# Patient Record
Sex: FEMALE | Race: WHITE | Employment: OTHER | URBAN - NONMETROPOLITAN AREA
[De-identification: names, ages, dates, MRNs, and addresses within clinical notes are randomized per-mention and may not be internally consistent; named-entity substitution may affect disease eponyms.]

---

## 2019-02-18 ENCOUNTER — HOSPITAL ENCOUNTER (OUTPATIENT)
Dept: GENERAL RADIOLOGY | Age: 59
Discharge: HOME | End: 2019-02-20
Payer: COMMERCIAL

## 2019-02-18 ENCOUNTER — HOSPITAL ENCOUNTER (OUTPATIENT)
Age: 59
Discharge: HOME | End: 2019-02-20
Payer: COMMERCIAL

## 2019-02-18 DIAGNOSIS — M25.552 LEFT HIP PAIN: ICD-10-CM

## 2019-02-18 DIAGNOSIS — M25.551 HIP PAIN, RIGHT: ICD-10-CM

## 2019-02-18 PROCEDURE — 73521 X-RAY EXAM HIPS BI 2 VIEWS: CPT

## 2019-06-10 ENCOUNTER — HOSPITAL ENCOUNTER (OUTPATIENT)
Dept: MRI IMAGING | Age: 59
Discharge: HOME | End: 2019-06-12
Payer: COMMERCIAL

## 2019-06-10 DIAGNOSIS — M25.552 LEFT HIP PAIN: ICD-10-CM

## 2019-06-10 PROCEDURE — 73721 MRI JNT OF LWR EXTRE W/O DYE: CPT

## 2020-12-21 ENCOUNTER — HOSPITAL ENCOUNTER (OUTPATIENT)
Dept: PHYSICAL THERAPY | Age: 60
Setting detail: THERAPIES SERIES
Discharge: HOME | End: 2020-12-21
Payer: COMMERCIAL

## 2020-12-21 PROCEDURE — 97161 PT EVAL LOW COMPLEX 20 MIN: CPT

## 2020-12-21 PROCEDURE — 97110 THERAPEUTIC EXERCISES: CPT

## 2020-12-23 ENCOUNTER — HOSPITAL ENCOUNTER (OUTPATIENT)
Dept: PHYSICAL THERAPY | Age: 60
Setting detail: THERAPIES SERIES
Discharge: HOME | End: 2020-12-23
Payer: COMMERCIAL

## 2020-12-23 PROCEDURE — 97110 THERAPEUTIC EXERCISES: CPT

## 2020-12-23 NOTE — PROGRESS NOTES
Phone: [address]          Fax: [phone]                      Outpatient Physical Therapy                                                                      Evaluation  Date: 2020  Patient: Saeed Stoddard  : 1960  CSN #: [id number]  Referring Practitioner: Ursula Ruiz MD    Referral Date : 20     Medical Diagnosis: s/p L DARNELL, Q14.857, hip pain, M25.559    Treatment Diagnosis: s/p L DARNELL  Onset Date: 20  PT Insurance Information: Medical [place]  Total # of Visits Approved: 12   Total # of Visits to Date: 1  No Show: 0  Canceled Appointment: 0     Subjective  Subjective: Patient reports having a lateral DARNELL on 2020. Patient reports she has received home health PT and has continued with her HEP. Patient reports pain ranges from 2/10 at best to 4/10 at worst.  She reports she was walking in [employer] parking lot when she had to move laterally quickly and reports she's had increased pain since then. She reports she has been using the cane and reports she was able to ambulate without cane around her home prior to yesterday's incident. 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 Additional Pertinent Hx: L RCR in 2015, depression    Objective     Observation/Palpation  Posture: Fair  Body Mechanics: Patient ambulates with SC, antalgic gait pattern  Scar: Healing well    Strength RLE  Strength RLE: WFL  Strength LLE  Strength LLE: Exception  L Hip Flexion: 3+/5  L Hip Extension: 3+/5  L Hip ABduction: 2+/5  L Hip ADduction: NT  L Knee Flexion: 3+/5  L Knee Extension: 4/5    Functional Outcome Measures  Pt disability report: 100% disabled  Assessment  Body structures, Functions, Activity limitations: Decreased functional mobility , Increased pain, Decreased ADL status, Decreased balance, Decreased ROM, Decreased strength  Assessment: The patient is a 61 y.o. female s/p L DARNELL.   She demonstrates decreased L hip ROM, decreased L LE strength, decreased endurance, and impaired balance. She would benefit from skilled PT to address her deficits to improve functional mobility. Prognosis: Good        Decision Making: Low Complexity    Patient Education  Patient Education: POC and exercise rationale  Pt verbalized/demonstrated good understanding:     [X] Yes         [] No, pt required further clarification. Goals  Short term goals  Time Frame for Short term goals: 3 weeks  Short term goal 1: Patient will be initiated with a HEP -MET  Short term goal 2: Patient will tolerate 30 minutes of therex/act to improve strength and endurance for ADLs    Long term goals  Time Frame for Long term goals : 6 weeks  Long term goal 1: Patient will be independent and compliant with a HEP  Long term goal 2: Patient will improve L LE strength to >/= 4/5 in all major joints and planes  Long term goal 3: Patient will improve L hip extension to >5* to normalize gait pattern  Long term goal 4: Patient will report decreased pain to <3/10 at worst  Long term goal 5: Patient will report 70% improvement in symptoms and overall function.     Patient goals : Walk correctly without pain    Minutes Tracking:  Time In: 1515  Time Out: 1610  Minutes: 55  Timed Code Treatment Minutes: Keila CHAPA Neal , [place], DPT    12/21/2020

## 2020-12-23 NOTE — PLAN OF CARE
[facility]           Phone: [phone]             Outpatient Physical Therapy  Fax: [phone]                                           Date: 2020  Patient: Joe Galindo : 1960 [facility] #: [id number]   Referring Practitioner:  Shanice Michaud MD Referral Date:  20       [x] Plan of Care   [] Updated Plan of Care    Dates of Service to Include: 2020 to 21    Diagnosis:  s/p L DARNELL, Z96.642, hip pain, M25.559    Rehab (Treatment) Diagnosis:  s/p L DARNELL             Onset Date:  20    Attendance  Total # of Visits to Date: 1 No Show: 0 Canceled Appointment: 0    Assessment  Body structures, Functions, Activity limitations: Decreased functional mobility , Increased pain, Decreased ADL status, Decreased balance, Decreased ROM, Decreased strength  Assessment: The patient is a 61 y.o. female s/p L DARNELL. She demonstrates decreased L hip ROM, decreased L LE strength, decreased endurance, and impaired balance. She would benefit from skilled PT to address her deficits to improve functional mobility. Goals  Short term goals  Time Frame for Short term goals: 3 weeks  Short term goal 1: Patient will be initiated with a HEP -MET  Short term goal 2: Patient will tolerate 30 minutes of therex/act to improve strength and endurance for ADLs  Long term goals  Time Frame for Long term goals : 6 weeks  Long term goal 1: Patient will be independent and compliant with a HEP  Long term goal 2: Patient will improve L LE strength to >/= 4/5 in all major joints and planes  Long term goal 3: Patient will improve L hip extension to >5* to normalize gait pattern  Long term goal 4: Patient will report decreased pain to <3/10 at worst  Long term goal 5: Patient will report 70% improvement in symptoms and overall function.      Prognosis  Prognosis: Good    Treatment Plan   Times per week: 2-3  Plan weeks: 6  [x] HP/CP      [] Electrical Stim   [x] Therapeutic Exercise      [x] Gait Training  [x] Aquatics   [] Ultrasound         [x] Patient Education/HEP   [x] Manual Therapy  [] Traction    [x] Neuro-shayna        [x] Soft Tissue Mobs            [] Home TENS  [] Iontophoresis    [] Orthotic casting/fitting      [] Dry Needling             Electronically signed by: James Shine PT, DPT    Date: 12/21/2020      ______________________________________ Date: 12/21/2020   Physician Signature

## 2020-12-23 NOTE — PROGRESS NOTES
Phone: [place]           Fax: [phone]                           Outpatient Physical Therapy                                                                            Daily Note    Patient: Andrey Macias : 1960  CSN #: [id number]   Referring Practitioner:  Irena Gant MD    Referral Date : 20     Date: 2020    Diagnosis: s/p L DARNELL, W40.103, hip pain, M25.559  Treatment Diagnosis: s/p L DARNELL    Onset Date: 20  PT Insurance Information: Medical [place]  Total # of Visits Approved: 12 Per Physician Order  Total # of Visits to Date: 2  No Show: 0  Canceled Appointment: 0      Pre-Treatment Pain:  3/10  Subjective: Patient reports 3/10 L hip pain and reports she has resumed walking around the house without her cane. Exercises:  Exercise 1: HEP: hip flexor stretch, bridge, sink exercises  Exercise 2: Scifit x10 min L1.0  Exercise 3: Sink exercises x15  Exercise 4: Fwd/lateral small bebo stepovers at counter 10x ea way L LE  Exercise 5: Sit/stands, arms across chest, 2x10  Exercise 6: Supine DL bridges 2x10; modified SLR's 2x10  Exercise 7: Supine L hip flexor stretch (R SKTC) [id number]    Modalities:  Cryotherapy (Minutes\Location): CPx10 min in hooklying to L hip to decrease pain and edema       Assessment  Assessment: Initiated gentle ther ex and functional strengthening to improve L hip strength and mobility within patient tolerance. Patient reporting soreness following sit/stands. CP at end of session to decrease soreness. Will progress as able. Activity Tolerance  Activity Tolerance: Patient Tolerated treatment well    Patient Education  Exercise technique and progression    Pt verbalized/demonstrated good understanding:     [x] Yes         [] No, pt required further clarification.        Post Treatment Pain:  2/10      Plan  Times per week: 2-3  Plan weeks: 6      Goals  (Total # of Visits to Date: 2)      Short term goals  Time Frame for Short term goals: 3 weeks  Short term goal 1: Patient will be initiated with a HEP -MET  Short term goal 2: Patient will tolerate 30 minutes of therex/act to improve strength and endurance for ADLs-met    Long term goals  Time Frame for Long term goals : 6 weeks  Long term goal 1: Patient will be independent and compliant with a HEP  Long term goal 2: Patient will improve L LE strength to >/= 4/5 in all major joints and planes  Long term goal 3: Patient will improve L hip extension to >5* to normalize gait pattern  Long term goal 4: Patient will report decreased pain to <3/10 at worst  Long term goal 5: Patient will report 70% improvement in symptoms and overall function.     Minutes Tracking:  Time In: 3425  Time Out: [address]  Minutes: 49  Timed Code Treatment Minutes: 1 [address], [facility]     Date: 12/23/2020

## 2020-12-28 ENCOUNTER — HOSPITAL ENCOUNTER (OUTPATIENT)
Dept: PHYSICAL THERAPY | Age: 60
Setting detail: THERAPIES SERIES
Discharge: HOME | End: 2020-12-28
Payer: COMMERCIAL

## 2020-12-28 PROCEDURE — 97530 THERAPEUTIC ACTIVITIES: CPT

## 2020-12-28 PROCEDURE — 97110 THERAPEUTIC EXERCISES: CPT

## 2020-12-29 NOTE — PROGRESS NOTES
INSURANCE VERIFICATION    Unlimited Visits-BMN    Deductible- $1000 (individual) has been met/ $2000 (family)- has been met    Covered at 80% AFTER deductible    OOP- $3500 w/ $9564.88 met as of date/ $7000 family w/ $2844.58 met as of date    BFO#[id number] w/ Elizabeth @ [phone]    Electronically signed by Kang Sam on 12/29/2020 at 2:08 PM
clarification. Post Treatment Pain:  1/10      Plan  Times per week: 2-3  Plan weeks: 6      Goals  (Total # of Visits to Date: 3)      Short term goals  Time Frame for Short term goals: 3 weeks  Short term goal 1: Patient will be initiated with a HEP -MET  Short term goal 2: Patient will tolerate 30 minutes of therex/act to improve strength and endurance for ADLs-met    Long term goals  Time Frame for Long term goals : 6 weeks  Long term goal 1: Patient will be independent and compliant with a HEP  Long term goal 2: Patient will improve L LE strength to >/= 4/5 in all major joints and planes  Long term goal 3: Patient will improve L hip extension to >5* to normalize gait pattern  Long term goal 4: Patient will report decreased pain to <3/10 at worst  Long term goal 5: Patient will report 70% improvement in symptoms and overall function.     Minutes Tracking:  Time In: 1312  Time Out: 1405  Minutes: 53  Timed Code Treatment Minutes: 7946 Humaira Aguero PT, DPT     Date: 12/28/2020

## 2020-12-30 ENCOUNTER — HOSPITAL ENCOUNTER (OUTPATIENT)
Dept: PHYSICAL THERAPY | Age: 60
Setting detail: THERAPIES SERIES
Discharge: HOME | End: 2020-12-30
Payer: COMMERCIAL

## 2020-12-30 PROCEDURE — 97110 THERAPEUTIC EXERCISES: CPT

## 2020-12-30 NOTE — PROGRESS NOTES
Phone: [place]           Fax: [phone]                           Outpatient Physical Therapy                                                                            Daily Note    Patient: Jerad Jeffery : 1960  CSN #: [id number]   Referring Practitioner:  Alba Figueroa MD    Referral Date : 20     Date: 2020    Diagnosis: s/p L DARNELL, W20.298, hip pain, M25.559  Treatment Diagnosis: s/p L DARNELL    Onset Date: 20  PT Insurance Information: Medical [place]  Total # of Visits Approved: 12 Per Physician Order  Total # of Visits to Date: 4  No Show: 0  Canceled Appointment: 0      Pre-Treatment Pain:  2/10  Subjective: Pt reports minimal pain prior to therapy today, 2/10. Pt states her hip feels tight and achy more than anything. Pt states her pain is generally low unless she is on her feet for a while. Exercises:  Exercise 2: Scifit x10 min L2.5  Exercise 4: Fwd/lateral small bebo stepovers at counter 15x ea way L LE  Exercise 5: Sit/stands, arms across chest, 2x15  Exercise 6: Supine DL bridges 2x10; modified SLR's x10  Exercise 7: Supine L hip flexor stretch (R SKTC) [id number]  Exercise 8: YTB TKE 2x10, YTB seated hamstring curl 2x15  Exercise 9: FSU/LSU @ 4\" step x10 ea  Exercise 10: Lateral amb with YTB x4 counter lengths  Exercise 11: LAQ with 3# weight 2x10  Exercise 12: Amb large loop without SPC x1    Manual:       Modalities:  Cryotherapy (Minutes\Location): CPx10 min in hooklying to L hip to decrease pain and edema       Assessment  Assessment: Pt tolerated tx well without increase in pain. Pt states pain can go up to 7-8/10 at times, after ambulating for long distances and standing on her feet for a long time. Ice post tx for soreness. Activity Tolerance       Patient Education  Reviewed ice/heat at home for tightness. Pt verbalized/demonstrated good understanding:     [x] Yes         [] No, pt required further clarification. Post Treatment Pain:  2/10      Plan  Times per week: 2-3  Plan weeks: 6      Goals  (Total # of Visits to Date: 4)      Short term goals  Time Frame for Short term goals: 3 weeks  Short term goal 1: Patient will be initiated with a HEP -MET  Short term goal 2: Patient will tolerate 30 minutes of therex/act to improve strength and endurance for ADLs-met    Long term goals  Time Frame for Long term goals : 6 weeks  Long term goal 1: Patient will be independent and compliant with a HEP - progressing  Long term goal 2: Patient will improve L LE strength to >/= 4/5 in all major joints and planes  Long term goal 3: Patient will improve L hip extension to >5* to normalize gait pattern  Long term goal 4: Patient will report decreased pain to <3/10 at worst  Long term goal 5: Patient will report 70% improvement in symptoms and overall function.     Minutes Tracking:  Time In: 6948  Time Out: 1119  Minutes: [place] RENE     Date: 12/30/2020

## 2021-01-04 ENCOUNTER — HOSPITAL ENCOUNTER (OUTPATIENT)
Dept: PHYSICAL THERAPY | Age: 61
Setting detail: THERAPIES SERIES
Discharge: HOME | End: 2021-01-04
Payer: COMMERCIAL

## 2021-01-04 PROCEDURE — 97110 THERAPEUTIC EXERCISES: CPT

## 2021-01-04 NOTE — PROGRESS NOTES
goal 2: Patient will tolerate 30 minutes of therex/act to improve strength and endurance for ADLs-met    Long term goals  Time Frame for Long term goals : 6 weeks  Long term goal 1: Patient will be independent and compliant with a HEP - progressing  Long term goal 2: Patient will improve L LE strength to >/= 4/5 in all major joints and planes  Long term goal 3: Patient will improve L hip extension to >5* to normalize gait pattern  Long term goal 4: Patient will report decreased pain to <3/10 at worst  Long term goal 5: Patient will report 70% improvement in symptoms and overall function.     Minutes Tracking:  Time In: 1237  Time Out: 8953  Minutes: 58  Timed Code Treatment Minutes: [place], [place]       Date: 1/4/2021

## 2021-01-07 ENCOUNTER — HOSPITAL ENCOUNTER (OUTPATIENT)
Dept: PHYSICAL THERAPY | Age: 61
Setting detail: THERAPIES SERIES
Discharge: HOME | End: 2021-01-07
Payer: COMMERCIAL

## 2021-01-07 PROCEDURE — 97110 THERAPEUTIC EXERCISES: CPT

## 2021-01-07 NOTE — PROGRESS NOTES
Phone: [phone]                 [facility]           Fax: [phone]                           Outpatient Physical Therapy                                                                            Daily Note    Patient: Marshall Rivero : 1960  CSN #: [id number]   Referring Practitioner:  Talbert Scheuermann, MD    Referral Date : 20     Date: 2021    Diagnosis: s/p L DARNELL, E55.881, hip pain, M25.559  Treatment Diagnosis: s/p L DARNELL    Onset Date: 20  PT Insurance Information: Medical [place]  Total # of Visits Approved: 12 Per Physician Order  Total # of Visits to Date: 6  No Show: 0  Canceled Appointment: 0      Pre-Treatment Pain:  2/10  Subjective: Pt reports she was a little sore following last treatment but it didnt last long. Pt rates current pain a 1-2/10. Exercises:  Exercise 1: HEP: hip flexor stretch, bridge, sink exercises  Exercise 2: Scifit x10 min 3.0  Exercise 3: Air-ex toe raise/ heel raise and marches 15x ea plus sls 3x20 ea  Exercise 4: Fwd/lateral small bebo stepovers at counter 15x ea way B LE  Exercise 5: Sit/stands, arms across chest, 2x15  Exercise 7: Supine L hip flexor stretch (R SKTC) [id number]  Exercise 9: FSU/LSU @ 6\" step x15 ea L HR only  Exercise 10: Lateral amb with YTB x4 counter lengths plus toe taps 15x ea  Exercise 11: LAQ with 3# weight 2x10  Exercise 13: retro wes 10.5#    Modalities:  Cryotherapy (Minutes\Location): CPx10 min in hooklying to L hip to decrease pain and edema     Assessment  Assessment: L hip ext to neutral today with supine hip flexor stretch. Pt unweights LLE during sit to stand but is corrected with v/c. Will cont to progress towards LTG. Activity Tolerance  Activity Tolerance: Patient Tolerated treatment well    Patient Education  Patient Education: Cont HEP, COnt stretching. Pt verbalized/demonstrated good understanding:     [x] Yes         [] No, pt required further clarification.      Post Treatment Pain: 2/10    Plan  Times per week: 2-3  Plan weeks: 6    Goals  (Total # of Visits to Date: 6)      Short term goals  Time Frame for Short term goals: 3 weeks  Short term goal 1: Patient will be initiated with a HEP -MET  Short term goal 2: Patient will tolerate 30 minutes of therex/act to improve strength and endurance for ADLs-met    Long term goals  Time Frame for Long term goals : 6 weeks  Long term goal 1: Patient will be independent and compliant with a HEP - progressing  Long term goal 2: Patient will improve L LE strength to >/= 4/5 in all major joints and planes  Long term goal 3: Patient will improve L hip extension to >5* to normalize gait pattern  Long term goal 4: Patient will report decreased pain to <3/10 at worst  Long term goal 5: Patient will report 70% improvement in symptoms and overall function.     Minutes Tracking:  Time In: 2026  Time Out: 1026  Minutes: 55  Timed Code Treatment Minutes: 1530 Janna Ayoub [place], [place]       Date: 1/7/2021

## 2021-01-11 ENCOUNTER — HOSPITAL ENCOUNTER (OUTPATIENT)
Dept: PHYSICAL THERAPY | Age: 61
Setting detail: THERAPIES SERIES
Discharge: HOME | End: 2021-01-11
Payer: COMMERCIAL

## 2021-01-11 PROCEDURE — 97110 THERAPEUTIC EXERCISES: CPT

## 2021-01-11 PROCEDURE — 97530 THERAPEUTIC ACTIVITIES: CPT

## 2021-01-11 NOTE — PROGRESS NOTES
Phone: [place]           Fax: [phone]                           Outpatient Physical Therapy                                                                            Daily Note    Patient: Bg Bridges : 1960  CSN #: [id number]   Referring Practitioner:  Karis Stover MD    Referral Date : 20     Date: 2021    Diagnosis: s/p L DARNELL, Q76.571, hip pain, M25.559  Treatment Diagnosis: s/p L DARNELL    Onset Date: 20  PT Insurance Information: Medical [place]  Total # of Visits Approved: 12 Per Physician Order  Total # of Visits to Date: 7  No Show: 0  Canceled Appointment: 0      Pre-Treatment Pain:  0/10  Subjective: Pt reports no pain this date. Pt states she does not use cane at home, only when she goes out, and feels it is more of a nuisance. Exercises:  Exercise 2: Scifit x10 min 3.0  Exercise 3: Air-ex toe raise/ heel raise and marches 20x ea plus sls 2x30 ea  Exercise 4: Fwd/lateral small bebo stepovers at counter 15x ea way B LE  Exercise 5: Sit/stands, arms across chest, 2x15  Exercise 7: Supine L hip flexor stretch (R SKTC) [id number]  Exercise 9: FSU/LSU @ 6\" step x15 ea L HR only  Exercise 11: LAQ with 3# weight 2x10  Exercise 13: retro wes 10# x5  Exercise 14: Hip ABD/ABD 45 degrees 2x10 each way YTB    Modalities:  Cryotherapy (Minutes\Location): CPx10 min in hooklying to L hip to decrease edema. Assessment  Assessment: Pt able to tolerate increased workload with exercise this date. Pt able to perform sit to stand without unweighting and no verbal cueing, an improvement from last session. Towards end of session, pt demonstrated hip flexor tightness, leading to need for stretching at this time. CP applied to reduce inflammation. Activity Tolerance  Activity Tolerance: Patient Tolerated treatment well    Patient Education  Patient Education: Cont HEP, COnt stretching.   Pt verbalized/demonstrated good understanding: [x] Yes         [] No, pt required further clarification. Post Treatment Pain:  0/10      Plan  Times per week: 2-3  Plan weeks: 6      Goals  (Total # of Visits to Date: 7)      Short term goals  Time Frame for Short term goals: 3 weeks  Short term goal 1: Patient will be initiated with a HEP -MET  Short term goal 2: Patient will tolerate 30 minutes of therex/act to improve strength and endurance for ADLs-met    Long term goals  Time Frame for Long term goals : 6 weeks  Long term goal 1: Patient will be independent and compliant with a HEP - progressing  Long term goal 2: Patient will improve L LE strength to >/= 4/5 in all major joints and planes  Long term goal 3: Patient will improve L hip extension to >5* to normalize gait pattern  Long term goal 4: Patient will report decreased pain to <3/10 at worst-MET  Long term goal 5: Patient will report 70% improvement in symptoms and overall function.     Minutes Tracking:  Time In: 1031  Time Out: [address]  Minutes: 51  Timed Code Treatment Minutes: [address] SPT directly supervised by: Abdirashid Joe PT, DPT     Date: 1/11/2021

## 2021-01-14 ENCOUNTER — HOSPITAL ENCOUNTER (OUTPATIENT)
Dept: PHYSICAL THERAPY | Age: 61
Setting detail: THERAPIES SERIES
Discharge: HOME | End: 2021-01-14
Payer: COMMERCIAL

## 2021-01-14 PROCEDURE — 97110 THERAPEUTIC EXERCISES: CPT

## 2021-01-14 NOTE — PROGRESS NOTES
Phone: [phone]                 [facility]           Fax: [phone]                           Outpatient Physical Therapy                                                                            Daily Note    Patient: Pradeep Min : 1960  [facility] #: [id number]   Referring Practitioner:  Sara Menjivar MD    Referral Date : 20     Date: 2021    Diagnosis: s/p L DARNELL, X67.760, hip pain, M25.559  Treatment Diagnosis: s/p L DARNELL    Onset Date: 20  PT Insurance Information: Medical [place]  Total # of Visits Approved: 12 Per Physician Order  Total # of Visits to Date: 8  No Show: 0  Canceled Appointment: 0    Pre-Treatment Pain:  0/10  Subjective: Pt states she feels she is doing well. Pt is hoping to RTW in feb. Pt states she really isnt having any pain, just soreness. Exercises:  Exercise 1: HEP: hip flexor stretch, bridge, sink exercises updated bridges, clamshels and hip abd in sidelying with bent knee. Also sideways amb with GTB  Exercise 2: Scifit x10 min 3.5  Exercise 4: Fwd/lateral small bebo stepovers at counter 15x ea way B LE  Exercise 5: Sit/stands, arms across chest, 2x15 5# L leg slightly behind. Exercise 9: FSU/LSU @ 6\" step x15 ea L HR only Opp toe tap  Exercise 13: retro wes 10# x5  Exercise 14: Hip ABD/ABD 45 degrees 2x10 each way YTB    Modalities:  Cryotherapy (Minutes\Location): CPx10 min in hooklying to L hip to decrease pain and edema     Assessment  Assessment: Added glute med strengthening exercises to HEP today to help better gait cycle. Pt cont to use SC for all distances per surgeon orders. Activity Tolerance  Activity Tolerance: Patient Tolerated treatment well    Patient Education  Patient Education: New HEp  Pt verbalized/demonstrated good understanding:     [x] Yes         [] No, pt required further clarification.      Post Treatment Pain:  0/10    Plan  Times per week: 2-3  Plan weeks: 6    Goals  (Total # of Visits to Date: 8) Short term goals  Time Frame for Short term goals: 3 weeks  Short term goal 1: Patient will be initiated with a HEP -MET  Short term goal 2: Patient will tolerate 30 minutes of therex/act to improve strength and endurance for ADLs-met    Long term goals  Time Frame for Long term goals : 6 weeks  Long term goal 1: Patient will be independent and compliant with a HEP - progressing  Long term goal 2: Patient will improve L LE strength to >/= 4/5 in all major joints and planes  Long term goal 3: Patient will improve L hip extension to >5* to normalize gait pattern  Long term goal 4: Patient will report decreased pain to <3/10 at worst-MET  Long term goal 5: Patient will report 70% improvement in symptoms and overall function.     Minutes Tracking:  Time In: 5232  Time Out: 1026  Minutes: 57  Timed Code Treatment Minutes: [place], [place]       Date: 1/14/2021

## 2021-01-18 ENCOUNTER — HOSPITAL ENCOUNTER (OUTPATIENT)
Dept: PHYSICAL THERAPY | Age: 61
Setting detail: THERAPIES SERIES
Discharge: HOME | End: 2021-01-18
Payer: COMMERCIAL

## 2021-01-18 PROCEDURE — 97110 THERAPEUTIC EXERCISES: CPT

## 2021-01-18 PROCEDURE — 97530 THERAPEUTIC ACTIVITIES: CPT

## 2021-01-18 NOTE — PROGRESS NOTES
Phone: [phone]                 [facility]           Fax: [phone]                           Outpatient Physical Therapy                                                                            Daily Note    Patient: Maykel Rockwell : 1960  [facility] #: [id number]   Referring Practitioner:  Luis Ga MD    Referral Date : 20     Date: 2021    Diagnosis: s/p L DARNELL, X55.174, hip pain, M25.559  Treatment Diagnosis: s/p L DARNELL    Onset Date: 20  PT Insurance Information: Medical [place]  Total # of Visits Approved: 12 Per Physician Order  Total # of Visits to Date: 9      Pre-Treatment Pain:  0/10  Subjective: Pt states she feels pretty good. Pt is not having any pain at this time, and still plans to RTW at the end of the month. Exercises:  Exercise 1: HEP: hip flexor stretch, bridge, sink exercises updated bridges (1 set of SL), clamshells (YTB) and hip abd in sidelying with bent knee (no resistance). Also sideways amb with GTB  Exercise 2: Scifit x10 min 3.5  Exercise 3: Air-ex toe raise/ heel raise and marches 20x ea plus tandem stance 2x30 seconds each leg and sls 2x30 ea  Exercise 4: Fwd/lateral small bebo stepovers at counter 15x ea way B LE  Exercise 5: Sit/stands, arms across chest, 2x15 5# L leg slightly behind. Exercise 9: FSU/LSU @ 6\" step x15 ea L HR only Opp toe tap  Exercise 13: retro wes 10# x5  Exercise 14: Hip ABD/ABD 45 degrees 2x15 each way YTB    Modalities:  Cryotherapy (Minutes\Location): CPx10 min in hooklying to L hip to decrease pain and edema       Assessment  Assessment: Pt tolerated exercise well this date. Continued with expanding HEP this date, adding single leg bridges to increase hip stability. Continue to progress as tolerated.     Activity Tolerance  Activity Tolerance: Patient Tolerated treatment well    Patient Education  Patient Education: Added to HEP, exercise rationale  Pt verbalized/demonstrated good understanding:     [x] Yes [] No, pt required further clarification. Post Treatment Pain:  0/10      Plan  Times per week: 2-3  Plan weeks: 6      Goals  (Total # of Visits to Date: 5)      Short term goals  Time Frame for Short term goals: 3 weeks  Short term goal 1: Patient will be initiated with a HEP -MET  Short term goal 2: Patient will tolerate 30 minutes of therex/act to improve strength and endurance for ADLs-met    Long term goals  Time Frame for Long term goals : 6 weeks  Long term goal 1: Patient will be independent and compliant with a HEP - progressing  Long term goal 2: Patient will improve L LE strength to >/= 4/5 in all major joints and planes  Long term goal 3: Patient will improve L hip extension to >5* to normalize gait pattern  Long term goal 4: Patient will report decreased pain to <3/10 at worst-MET  Long term goal 5: Patient will report 70% improvement in symptoms and overall function.     Minutes Tracking:  Time In: 1031  Time Out: 8857  Minutes: 56  Timed Code Treatment Minutes: 1725 [facility] directly supervised by: Val Goins PT, DPT    Date: 1/18/2021

## 2021-01-21 ENCOUNTER — HOSPITAL ENCOUNTER (OUTPATIENT)
Dept: PHYSICAL THERAPY | Age: 61
Setting detail: THERAPIES SERIES
Discharge: HOME | End: 2021-01-21
Payer: COMMERCIAL

## 2021-01-21 PROCEDURE — 97110 THERAPEUTIC EXERCISES: CPT

## 2021-01-21 NOTE — PROGRESS NOTES
Phone: [phone]                 [facility]           Fax: [phone]                           Outpatient Physical Therapy                                                                            Daily Note    Patient: Mely Hairston : 1960  CSN #: [id number]   Referring Practitioner:  Nikkie Torres MD    Referral Date : 20     Date: 2021    Diagnosis: s/p L DARNELL, J26.864, hip pain, M25.559  Treatment Diagnosis: s/p L DARNELL    Onset Date: 20  PT Insurance Information: Medical [place]  Total # of Visits Approved: 12 Per Physician Order  Total # of Visits to Date: 10  No Show: 0  Canceled Appointment: 0      Pre-Treatment Pain: 0 /10  Subjective: Pt reports no pain today. Just her \"usual stiffness and soreness. \"    Exercises:  Exercise 1: HEP: hip flexor stretch, bridge, sink exercises updated bridges (1 set of SL), clamshells (YTB) and hip abd in sidelying with bent knee (no resistance). Also sideways amb with GTB  Exercise 2: Scifit x10 min 3.5  Exercise 3: Air-ex toe raise/ heel raise and marches 20x ea plus tandem stance 2x30 seconds each leg and sls 2x30 ea  Exercise 4: Fwd/lateral small bebo stepovers at counter 15x ea way B LE  Exercise 5: Sit/stands 2x15 5# L leg slightly behind. Exercise 9: FSU/LSU @ 6\" step x15 ea L HR only Opp toe tap  Exercise 11: LAQ with 3# weight 2x15  Exercise 13: retro wes 10# x5  Exercise 14: Hip ABD/ABD 45 degrees 2x15 each way YTB    Assessment  Assessment: Continued with currrent program to increase hip stability. Pt having occasional LOB with narrow GABRIEL activities and is able to self correct. Pt declined need for CP at end of tx d/t having to leave for her daughters ultrasound. Will continue to progress as able. Activity Tolerance  Activity Tolerance: Patient Tolerated treatment well    Patient Education  Exercise rationale and technique.   Pt verbalized/demonstrated good understanding:     [x] Yes         [] No, pt required

## 2021-01-25 ENCOUNTER — HOSPITAL ENCOUNTER (OUTPATIENT)
Dept: PHYSICAL THERAPY | Age: 61
Setting detail: THERAPIES SERIES
Discharge: HOME | End: 2021-01-25
Payer: COMMERCIAL

## 2021-01-25 PROCEDURE — 97110 THERAPEUTIC EXERCISES: CPT

## 2021-01-25 NOTE — PROGRESS NOTES
Phone: [phone]                 [facility]           Fax: [phone]                           Outpatient Physical Therapy                                                                            Daily Note    Patient: Rob Araujo : 1960  CSN #: [id number]   Referring Practitioner:  Tanisha Rahman MD    Referral Date : 20     Date: 2021    Diagnosis: s/p L DARNELL, A16.783, hip pain, M25.559  Treatment Diagnosis: s/p L DARNELL    Onset Date: 20  PT Insurance Information: Medical [place]  Total # of Visits Approved: 12 Per Physician Order  Total # of Visits to Date: 11  No Show: 0  Canceled Appointment: 0    Pre-Treatment Pain:  0/10  Subjective: Pt states she feels like shes doing pretty good. Pt rates current pain a 0/10. Exercises:  Exercise 1: HEP: hip flexor stretch, bridge, sink exercises updated bridges (1 set of SL), clamshells (YTB) and hip abd in sidelying with bent knee (no resistance). Also sideways amb with GTB  Exercise 2: Scifit x10 min 3.5  Exercise 3: Air-ex toe raise/ heel raise and marches 20x ea plus tandem stance 2x30 seconds each leg and sls 2x30 ea  Exercise 4: Fwd/lateral small bebo stepovers at counter 15x ea way B LE  Exercise 5: Sit/stands 2x15 5# L leg slightly behind. Exercise 9: FSU/LSU @ 6\" step x15 ea L HR only Opp toe tap SD 6 inch  Exercise 10: Lateral amb with YTB x4 counter lengths plus toe taps 15x ea  Exercise 13: retro wes 10# x5 5# sideways amb  Exercise 14: Hip ABD/ABD 45 degrees 2x15 each way YTB    Assessment  Assessment: Pt reports pain can range from a 0-5/10 on normal day to day basis but her pain is more of a muscle soreness and tightness but no longer needs pain pills. Pt reports she is approx 95% off AD currently but only keeps if if icy conditions.    Pt able to ascend<>descend 3 6 inch steps without handrail but states she still uses one for greater number of steps,  Current L hip ROM with Pt self stretch 2* ext to 120* hip flex. L hip flex MMT grossly 4/5 and L glute med 3+/5. Pt continues to display lateral trunk lean with amb without AD. Activity Tolerance  Activity Tolerance: Patient Tolerated treatment well    Patient Education  Patient Education: Reviewed HEP to cont for glute med. Pt verbalized/demonstrated good understanding:     [x] Yes         [] No, pt required further clarification. Post Treatment Pain:  0/10    Plan  Times per week: 2-3  Plan weeks: 6    Goals  (Total # of Visits to Date: 6)      Short term goals  Time Frame for Short term goals: 3 weeks  Short term goal 1: Patient will be initiated with a HEP -MET  Short term goal 2: Patient will tolerate 30 minutes of therex/act to improve strength and endurance for ADLs-met    Long term goals  Time Frame for Long term goals : 6 weeks  Long term goal 1: Patient will be independent and compliant with a HEP - progressing  Long term goal 2: Patient will improve L LE strength to >/= 4/5 in all major joints and planes  Long term goal 3: Patient will improve L hip extension to >5* to normalize gait pattern  Long term goal 4: Patient will report decreased pain to <3/10 at worst-MET  Long term goal 5: Patient will report 70% improvement in symptoms and overall function.     Minutes Tracking:  Time In: 1101  Time Out: 4660  Minutes: 44  Timed Code Treatment Minutes: [address] [place], [place]       Date: 1/25/2021

## 2021-01-25 NOTE — PLAN OF CARE
[facility]           Phone: [phone]             Outpatient Physical Therapy  Fax: [phone]                                           Date: 2021  Patient: Samir Castellanos : 1960 [facility] #: [id number]   Referring Practitioner:  Thuy Morales MD Referral Date:  20       [] Plan of Care   [x] Updated Plan of Care    Dates of Service to Include: 2021 to 21    Diagnosis:  s/p L DARNELL, R96.048, hip pain, M25.559    Rehab (Treatment) Diagnosis:  s/p L DARNELL             Onset Date:  20    Attendance  Total # of Visits to Date: 6 No Show: 0 Canceled Appointment: 0    Assessment  Assessment: Pt reports pain can range from a 0-5/10 on normal day to day basis but her pain is more of a muscle soreness and tightness but no longer needs pain pills. Pt reports she is approx 95% off AD currently but only keeps if if icy conditions. Pt able to ascend<>descend 3 6 inch steps without handrail but states she still uses one for greater number of steps,  Current L hip ROM with Pt self stretch 2* ext to 120* hip flex. L hip flex MMT grossly 4/5 and L glute med 3+/5. Pt continues to display lateral trunk lean with amb without AD.       Goals  Short term goals  Time Frame for Short term goals: 3 weeks  Short term goal 1: Patient will be initiated with a HEP -MET  Short term goal 2: Patient will tolerate 30 minutes of therex/act to improve strength and endurance for ADLs-met  Long term goals  Time Frame for Long term goals : 6 weeks  Long term goal 1: Patient will be independent and compliant with a HEP - progressing  Long term goal 2: Patient will improve L LE strength to >/= 4/5 in all major joints and planes  Long term goal 3: Patient will improve L hip extension to >5* to normalize gait pattern  Long term goal 4: Patient will report decreased pain to <3/10 at worst-MET  Long term goal 5: Patient will report 70% improvement in symptoms and overall function.      Prognosis  GOOD    Treatment Plan   Times per week: 2  Plan weeks: 4  [x] HP/CP      [] Electrical Stim   [x] Therapeutic Exercise      [x] Gait Training  [] Aquatics   [] Ultrasound         [x] Patient Education/HEP   [x] Manual Therapy  [] Traction    [x] Neuro-shayna        [x] Soft Tissue Mobs            [] Home TENS  [] Iontophoresis    [] Orthotic casting/fitting      [] Dry Needling             Electronically signed by: Orly Rosario PT, DPT    Date: 1/25/2021      ______________________________________ Date: 1/25/2021   Physician Signature

## 2021-01-28 ENCOUNTER — HOSPITAL ENCOUNTER (OUTPATIENT)
Dept: PHYSICAL THERAPY | Age: 61
Setting detail: THERAPIES SERIES
Discharge: HOME | End: 2021-01-28
Payer: COMMERCIAL

## 2021-01-28 PROCEDURE — 97110 THERAPEUTIC EXERCISES: CPT

## 2021-01-28 NOTE — PROGRESS NOTES
Phone: [facility]           Fax: [phone]                           Outpatient Physical Therapy                                                                            Daily Note    Patient: Paramjit Pope : 1960  CSN #: [id number]   Referring Practitioner:  Eda Cooks, MD    Referral Date : 20     Date: 2021    Diagnosis: s/p L DARNELL, I05.114, hip pain, M25.559  Treatment Diagnosis: s/p L DARNELL    Onset Date: 20  PT Insurance Information: Medical [place]  Total # of Visits Approved: 20 Per Physician Order  Total # of Visits to Date: 12  No Show: 0  Canceled Appointment: 0      Pre-Treatment Pain:  3/10  Subjective: Pt reports she hasnt been able to reach the Dr yet regarding RTW but hoping to hear back soon. Pt rates current pain a 3/10. Exercises:  Exercise 1: HEP: hip flexor stretch, bridge, sink exercises updated bridges (1 set of SL), clamshells (YTB) and hip abd in sidelying with bent knee (no resistance). Also sideways amb with GTB  Exercise 2: Scifit x10 min 4.5  Exercise 4: Fwd/lateral small bebo stepovers at counter 15x ea way B LE  Exercise 5: Sit/stands 2x15 5# L leg slightly behind. Exercise 9: FSU 8 inch/LSU @ 6\" step x15 ea L HR only Opp toe tap SD 6 inch  Exercise 10: Lateral amb with YTB x4 counter lengths plus toe taps 15x ea  Exercise 13: retro wes 10# x5 5# sideways amb  Exercise 14: Hip ABD/ABD 45 degrees 2x15 each way YTB  Exercise 15: Leg Press 5 pl dl 2x10 and SL 2 pl 10x    Modalities:  Cryotherapy (Minutes\Location): CPx10 min in hooklying to L hip to decrease pain and edema     Assessment  Assessment: Pt continues to display L glute med strength limitations which is leading to antalgic gait cycle. Plan is to cont working on increased L hip strength. Activity Tolerance  Activity Tolerance: Patient Tolerated treatment well    Patient Education  Patient Education: Cont HEP.   Pt verbalized/demonstrated good understanding:     [x] Yes         [] No, pt required further clarification. Post Treatment Pain:  2/10    Plan  Times per week: 2-3  Plan weeks: 6    Goals  (Total # of Visits to Date: 15)      Short term goals  Time Frame for Short term goals: 3 weeks  Short term goal 1: Patient will be initiated with a HEP -MET  Short term goal 2: Patient will tolerate 30 minutes of therex/act to improve strength and endurance for ADLs-met    Long term goals  Time Frame for Long term goals : 6 weeks  Long term goal 1: Patient will be independent and compliant with a HEP - progressing  Long term goal 2: Patient will improve L LE strength to >/= 4/5 in all major joints and planes  Long term goal 3: Patient will improve L hip extension to >5* to normalize gait pattern  Long term goal 4: Patient will report decreased pain to <3/10 at worst-MET  Long term goal 5: Patient will report 70% improvement in symptoms and overall function.     Minutes Tracking:  Time In: 7644  Time Out: 1026  Minutes: 55  Timed Code Treatment Minutes: [address] [place], [place]       Date: 1/28/2021

## 2021-02-02 ENCOUNTER — HOSPITAL ENCOUNTER (OUTPATIENT)
Dept: PHYSICAL THERAPY | Age: 61
Setting detail: THERAPIES SERIES
Discharge: HOME | End: 2021-02-02
Payer: COMMERCIAL

## 2021-02-02 PROCEDURE — 97110 THERAPEUTIC EXERCISES: CPT

## 2021-02-02 NOTE — PROGRESS NOTES
Phone: [facility]           Fax: [phone]                           Outpatient Physical Therapy                                                                            Daily Note    Patient: Brain Flor : 1960  CSN #: [id number]   Referring Practitioner:  Jorge Luis Naranjo MD    Referral Date : 20     Date: 2021    Diagnosis: s/p L DARNELL, Y59.829, hip pain, M25.559  Treatment Diagnosis: s/p L DARNELL    Onset Date: 20  PT Insurance Information: Medical [place]  Total # of Visits Approved: 20 Per Physician Order  Total # of Visits to Date: 13  No Show: 0  Canceled Appointment: 0      Pre-Treatment Pain:  0/10  Subjective: Pt denies pain upon arrival. Pt states she was a little sore after last visit but subsided quickly. Pt states she is planning on going back to work tomorrow, stating she was able to return on Monday but school has been closed the past 2 days d/t snow. Exercises:  Exercise 2: Scifit x10 min 4.5  Exercise 4: Fwd/lateral small bebo stepovers at counter 15x ea way B LE  Exercise 5: Sit/stands 2x15 5# L leg slightly behind. Exercise 9: FSU 8 inch/LSU @ 6\" step x15 ea L HR only Opp toe tap SD 6 inch  Exercise 10: Lateral amb with YTB x4 counter lengths plus toe taps 15x ea  Exercise 13: retro wes 10# x5 5# sideways amb  Exercise 14: Hip ABD/ABD 45 degrees 2x15 each way YTB  Exercise 15: Leg Press 5 pl dl 2x10 and SL 3 pl 10x    Modalities:  Cryotherapy (Minutes\Location): CPx10 min in hooklying to L hip to decrease pain and edema       Assessment  Assessment: Gradual progressions made to ther ex program this date, which pt tolerates well. Pt scheduled to RTW tomorrow. Will progress as tolerated. Activity Tolerance  Activity Tolerance: Patient Tolerated treatment well    Patient Education  Patient Education: Reviewed exercise rationale.   Pt verbalized/demonstrated good understanding:     [x] Yes         [] No, pt required further clarification. Post Treatment Pain:  0/10      Plan  Times per week: 2-3  Plan weeks: 6      Goals  (Total # of Visits to Date: 15)      Short term goals  Time Frame for Short term goals: 3 weeks  Short term goal 1: Patient will be initiated with a HEP -MET  Short term goal 2: Patient will tolerate 30 minutes of therex/act to improve strength and endurance for ADLs-met    Long term goals  Time Frame for Long term goals : 6 weeks  Long term goal 1: Patient will be independent and compliant with a HEP - progressing  Long term goal 2: Patient will improve L LE strength to >/= 4/5 in all major joints and planes  Long term goal 3: Patient will improve L hip extension to >5* to normalize gait pattern  Long term goal 4: Patient will report decreased pain to <3/10 at worst-MET  Long term goal 5: Patient will report 70% improvement in symptoms and overall function.     Minutes Tracking:  Time In: 7505  Time Out: 1700  Minutes: 43  Timed Code Treatment Minutes: 1720 [place]     Date: 2/2/2021

## 2021-02-04 ENCOUNTER — HOSPITAL ENCOUNTER (OUTPATIENT)
Dept: PHYSICAL THERAPY | Age: 61
Setting detail: THERAPIES SERIES
Discharge: HOME | End: 2021-02-04
Payer: COMMERCIAL

## 2021-02-04 PROCEDURE — 97110 THERAPEUTIC EXERCISES: CPT

## 2021-02-05 NOTE — PROGRESS NOTES
Phone: [phone]                 [facility]           Fax: [phone]                           Outpatient Physical Therapy                                                                            Daily Note    Patient: Ariadne Lam : 1960  CSN #: [id number]   Referring Practitioner:  Wayne Spence MD    Referral Date : 20     Date: 2021    Diagnosis: s/p L DARNELL, U65.199, hip pain, M25.559  Treatment Diagnosis: s/p L DARNELL    Onset Date: 20  PT Insurance Information: Medical [place]  Total # of Visits Approved: 20 Per Physician Order  Total # of Visits to Date: 14  No Show: 0  Canceled Appointment: 0      Pre-Treatment Pain:  4/10  Subjective: Pt reports she is now on a few days back to work and shes pretty sore. Pt reports pain in glutes/ back of knee and hip. Pt rates current pain a 4/10. Exercises:  Exercise 1: HEP: hip flexor stretch, bridge, sink exercises updated bridges (1 set of SL), clamshells (YTB) and hip abd in sidelying with bent knee (no resistance). Also sideways amb with GTB  Exercise 2: Scifit x10 min 4.5  Exercise 4: Fwd/lateral small bebo stepovers at counter 15x ea way B LE  Exercise 5: Sit/stands 2x15 5# L leg slightly behind. Exercise 7: Supine L hip flexor stretch (R SKTC) [id number]  Exercise 9: FSU 8 inch/LSU @ 6\" step x15 ea L HR only Opp toe tap SD 6 inch  Exercise 10: Lateral amb with YTB x4 counter lengths plus toe taps 15x ea  Exercise 13: retro wes 10# x5 5# sideways amb  Exercise 14: Hip ABD/ABD 45 degrees 2x15 each way YTB  Exercise 15: Leg Press 5 pl dl 2x10 and SL 3 pl 10x    Assessment  Assessment: Strength improvements noticed with ex but antalgic gait cycle remains. Held ice today per pt request to ice at home. Activity Tolerance  Activity Tolerance: Patient Tolerated treatment well    Patient Education  Patient Education: Reviewed exercise rationale.   Pt verbalized/demonstrated good understanding:     [x] Yes         [] No, pt required further clarification. Post Treatment Pain:  3/10    Plan  Times per week: 2-3  Plan weeks: 6    Goals  (Total # of Visits to Date: 15)      Short term goals  Time Frame for Short term goals: 3 weeks  Short term goal 1: Patient will be initiated with a HEP -MET  Short term goal 2: Patient will tolerate 30 minutes of therex/act to improve strength and endurance for ADLs-met    Long term goals  Time Frame for Long term goals : 6 weeks  Long term goal 1: Patient will be independent and compliant with a HEP - progressing  Long term goal 2: Patient will improve L LE strength to >/= 4/5 in all major joints and planes  Long term goal 3: Patient will improve L hip extension to >5* to normalize gait pattern  Long term goal 4: Patient will report decreased pain to <3/10 at worst-MET  Long term goal 5: Patient will report 70% improvement in symptoms and overall function.     Minutes Tracking:  Time In: 9457  Time Out: 1700  Minutes: 43  Timed Code Treatment Minutes: [address] [place], [place]       Date: 2/4/2021

## 2021-02-09 ENCOUNTER — HOSPITAL ENCOUNTER (OUTPATIENT)
Dept: PHYSICAL THERAPY | Age: 61
Setting detail: THERAPIES SERIES
Discharge: HOME | End: 2021-02-09
Payer: COMMERCIAL

## 2021-02-09 PROCEDURE — 97110 THERAPEUTIC EXERCISES: CPT

## 2021-02-09 NOTE — PROGRESS NOTES
Phone: [facility]           Fax: [phone]                           Outpatient Physical Therapy                                                                            Daily Note    Patient: Terrence Jack : 1960  CSN #: [id number]   Referring Practitioner:  Unique Lorenzana MD    Referral Date : 20     Date: 2021    Diagnosis: s/p L DARNELL, Q10.107, hip pain, M25.559  Treatment Diagnosis: s/p L DARNELL    Onset Date: 20  PT Insurance Information: Medical [place]  Total # of Visits Approved: 20 Per Physician Order  Total # of Visits to Date: 15  No Show: 0  Canceled Appointment: 0    Pre-Treatment Pain:  3/10  Subjective: Pt reports her hip is just a dull ache at a 3/10. Pt reports pain is \"just there like a tooth ache\". Exercises:  Exercise 1: HEP: hip flexor stretch, bridge, sink exercises updated bridges (1 set of SL), clamshells (YTB) and hip abd in sidelying with bent knee (no resistance). Also sideways amb with GTB  Exercise 2: Scifit x10 min 4.5  Exercise 4: Fwd/lateral small bebo stepovers at counter 15x ea way B LE 10 inch  Exercise 5: Sit/stands 2x15 5# L leg slightly behind. Exercise 9: FSU 8 inch/LSU @ 6\" step x15 ea L HR only Opp toe tap SD 6 inch  Exercise 10: Lateral amb with YTB x4 counter lengths plus toe taps 15x ea  Exercise 13: retro wes 12# x5 5# sideways amb  Exercise 14: Hip ABD/ABD 45 degrees 2x15 each way YTB  Exercise 15: Leg Press 5 pl dl 2x10 and SL 3 pl 10x  Exercise 16: hip abd GTB 15x ea    Assessment  Assessment: Continued to advance weights and reps to increase strength and endurance. Continued to work towards LTG. Activity Tolerance  Activity Tolerance: Patient Tolerated treatment well    Patient Education  Patient Education: Ex progressions  Pt verbalized/demonstrated good understanding:     [x] Yes         [] No, pt required further clarification.      Post Treatment Pain:  3/10    Plan  Times per week: 2-3  Plan weeks: 6    Goals  (Total # of Visits to Date: 13)      Short term goals  Time Frame for Short term goals: 3 weeks  Short term goal 1: Patient will be initiated with a HEP -MET    Long term goals  Time Frame for Long term goals : 6 weeks  Long term goal 1: Patient will be independent and compliant with a HEP - progressing  Long term goal 2: Patient will improve L LE strength to >/= 4/5 in all major joints and planes  Long term goal 3: Patient will improve L hip extension to >5* to normalize gait pattern  Long term goal 4: Patient will report decreased pain to <3/10 at worst-MET  Long term goal 5: Patient will report 70% improvement in symptoms and overall function.     Minutes Tracking:  Time In: 8703  Time Out: 1165  Minutes: 40  Timed Code Treatment Minutes: Blank Andres [address] [place] [address]       Date: 2/9/2021

## 2021-02-11 ENCOUNTER — APPOINTMENT (OUTPATIENT)
Dept: PHYSICAL THERAPY | Age: 61
End: 2021-02-11
Payer: COMMERCIAL

## 2021-02-12 ENCOUNTER — HOSPITAL ENCOUNTER (OUTPATIENT)
Dept: PHYSICAL THERAPY | Age: 61
Setting detail: THERAPIES SERIES
Discharge: HOME | End: 2021-02-12
Payer: COMMERCIAL

## 2021-02-12 PROCEDURE — 97110 THERAPEUTIC EXERCISES: CPT

## 2021-02-12 NOTE — PROGRESS NOTES
2-3  Plan weeks: 6    Goals  (Total # of Visits to Date: 12)      Short term goals  Time Frame for Short term goals: 3 weeks  Short term goal 1: Patient will be initiated with a HEP -MET  Short term goal 2: Patient will tolerate 30 minutes of therex/act to improve strength and endurance for ADLs-met    Long term goals  Time Frame for Long term goals : 6 weeks  Long term goal 1: Patient will be independent and compliant with a HEP - progressing  Long term goal 2: Patient will improve L LE strength to >/= 4/5 in all major joints and planes  Long term goal 3: Patient will improve L hip extension to >5* to normalize gait pattern  Long term goal 4: Patient will report decreased pain to <3/10 at worst-MET  Long term goal 5: Patient will report 70% improvement in symptoms and overall function.     Minutes Tracking:  Time In: 8399  Time Out: 4809  Minutes: 43  Timed Code Treatment Minutes: [address] [place] [place]     Date: 2/12/2021

## 2021-02-18 ENCOUNTER — HOSPITAL ENCOUNTER (OUTPATIENT)
Dept: PHYSICAL THERAPY | Age: 61
Setting detail: THERAPIES SERIES
Discharge: HOME | End: 2021-02-18
Payer: COMMERCIAL

## 2021-02-18 PROCEDURE — 97110 THERAPEUTIC EXERCISES: CPT

## 2021-02-19 NOTE — PROGRESS NOTES
Phone: [phone]                 [facility]           Fax: [phone]                           Outpatient Physical Therapy                                                                            Daily Note    Patient: Terry Genao : 1960  [facility] #: [id number]   Referring Practitioner:  Darrian Irvin MD    Referral Date : 20     Date: 2021    Diagnosis: s/p L DARNELL, A16.212, hip pain, M25.559  Treatment Diagnosis: s/p L DARNELL    Onset Date: 20  PT Insurance Information: Medical [place]  Total # of Visits Approved: 20 Per Physician Order  Total # of Visits to Date:   No Show: 0  Canceled Appointment: 1    Pre-Treatment Pain:  0/10  Subjective: Pt denies current pain. Pt states she gets sore after being on her leg for a long period but overall shes doing much better. Exercises:  Exercise 1: HEP: hip flexor stretch, bridge, sink exercises updated bridges (1 set of SL), clamshells (YTB) and hip abd in sidelying with bent knee (no resistance). Also sideways amb with GTB  Exercise 2: Scifit x10 min 3.0  Exercise 4: Fwd/lateral small bebo stepovers at counter 15x ea way B LE 10 inch  Exercise 5: Sit/stands 2x12 8# ball L leg slightly behind. Exercise 9: FSU 8 inch/LSU @ 6\" step x15 ea No HR, Opp toe tap SD 6 inch  Exercise 10: Lateral amb with YTB x4 counter lengths plus toe taps 15x ea  Exercise 13: retro wes 12# x8 5# x5 each sideways amb  Exercise 14: Hip ABD/ABD 45 degrees 2x15 each way YTB  Exercise 15: Leg Press 5 pl dl 2x10 and SL 3 pl 10x  Exercise 16: hip abd GTB 15x ea  Exercise 18: Joystick x10 Fwd/backwards, CW, CCW, figure 8    Assessment  Assessment: Strength progressing with ability to ascend<>descend 8 inch step with no UE assist, sit<>stand from 18 inch surface using 10# weight, step over 10 inch bebo with min compensation. Pt no longer using AD for community or household distances. L hip ROM 0 to 120* with self stretch.   Min glute med weakness remains but slowly improving. Plan is to d/c to HEP at this time. Activity Tolerance  Activity Tolerance: Patient Tolerated treatment well    Patient Education  Patient Education: Reviewed HEP for d/c.  Pt verbalized/demonstrated good understanding:     [x] Yes         [] No, pt required further clarification. Post Treatment Pain:  0/10    Plan  Times per week: 2-3  Plan weeks: 6    Goals  (Total # of Visits to Date: 16)      Short term goals  Time Frame for Short term goals: 3 weeks  Short term goal 1: Patient will be initiated with a HEP -MET  Short term goal 2: Patient will tolerate 30 minutes of therex/act to improve strength and endurance for ADLs-met    Long term goals  Time Frame for Long term goals : 6 weeks  Long term goal 1: Patient will be independent and compliant with a HEP - progressing  Long term goal 2: Patient will improve L LE strength to >/= 4/5 in all major joints and planes  Long term goal 3: Patient will improve L hip extension to >5* to normalize gait pattern  Long term goal 4: Patient will report decreased pain to <3/10 at worst-MET  Long term goal 5: Patient will report 70% improvement in symptoms and overall function.     Minutes Tracking:  Time In: 0464  Time Out: 1535  Minutes: 40  Timed Code Treatment Minutes: Yulyamarilis Stallings[address]       Date: 2/18/2021

## 2021-03-24 NOTE — DISCHARGE SUMMARY
Phone: Shirley          Fax: [phone]                            Outpatient Physical Therapy                                                                    Discharge Summary    Patient: Micaela Finley  : 1960  CSN #: [id number]   Referring physician: No admitting provider for patient encounter. Referring Practitioner: Lynne Ortega MD      Diagnosis: s/p L DARNELL, E18.903, hip pain, M25.559      Date Treatment Initiated: 2020  Date of Last Treatment: 2021      PT Visit Information  Onset Date: 20  PT Insurance Information: Medical [place]  Total # of Visits Approved: 20  Total # of Visits to Date: 16  Plan of Care/Certification Expiration Date: 21  No Show: 0  Canceled Appointment: 1      Frequency/Duration  2-3 times per week  6 weeks      Treatment Received  [x] HP/CP      [x] Electrical Stim   [x] Therapeutic Exercise      [x] Gait Training  [] Aquatics   [] Ultrasound         [x] Patient Education/HEP   [x] Manual Therapy  [] Traction    [x] Neuro-shayna        [x] Soft Tissue Mobs            [] Home TENS  [] Iontophoresis    [] Orthotic casting/fitting      [] Dry Needling    Assessment  Assessment: Strength progressing with ability to ascend<>descend 8 inch step with no UE assist, sit<>stand from 18 inch surface using 10# weight, step over 10 inch bebo with min compensation. Pt no longer using AD for community or household distances. L hip ROM 0 to 120* with self stretch. Min glute med weakness remains but slowly improving. Plan is to d/c to HEP at this time.        Goals  Short term goals  Time Frame for Short term goals: 3 weeks  Short term goal 1: Patient will be initiated with a HEP -MET  Short term goal 2: Patient will tolerate 30 minutes of therex/act to improve strength and endurance for ADLs-met    Long term goals  Time Frame for Long term goals : 6 weeks  Long term goal 1: Patient will be independent and compliant with a HEP - progressing  Long term goal 2: Patient will improve L LE strength to >/= 4/5 in all major joints and planes  Long term goal 3: Patient will improve L hip extension to >5* to normalize gait pattern  Long term goal 4: Patient will report decreased pain to <3/10 at worst-MET  Long term goal 5: Patient will report 70% improvement in symptoms and overall function.       Reason for Discharge  [] Goals Achieved                        []  Poor Follow Through/Attendance                  [x]  Optimal Function Achieved     []  Patient Discharged Self    []  Hospitalization                         []  Physician discharge      Thank you for this referral      Orly Rosario, PT, DPT               Date: 3/24/2021

## 2022-09-19 ENCOUNTER — APPOINTMENT (OUTPATIENT)
Dept: GENERAL RADIOLOGY | Age: 62
End: 2022-09-19
Payer: COMMERCIAL

## 2022-09-19 ENCOUNTER — HOSPITAL ENCOUNTER (EMERGENCY)
Age: 62
Discharge: HOME | End: 2022-09-19
Attending: EMERGENCY MEDICINE
Payer: COMMERCIAL

## 2022-09-19 VITALS
DIASTOLIC BLOOD PRESSURE: 83 MMHG | SYSTOLIC BLOOD PRESSURE: 131 MMHG | HEART RATE: 74 BPM | HEIGHT: 65 IN | WEIGHT: 175 LBS | OXYGEN SATURATION: 97 % | RESPIRATION RATE: 16 BRPM | TEMPERATURE: 98.6 F | BODY MASS INDEX: 29.16 KG/M2

## 2022-09-19 DIAGNOSIS — M79.18 MUSCULOSKELETAL PAIN: Primary | ICD-10-CM

## 2022-09-19 PROCEDURE — 6370000000 HC RX 637 (ALT 250 FOR IP): Performed by: EMERGENCY MEDICINE

## 2022-09-19 PROCEDURE — 73630 X-RAY EXAM OF FOOT: CPT

## 2022-09-19 PROCEDURE — 73502 X-RAY EXAM HIP UNI 2-3 VIEWS: CPT

## 2022-09-19 PROCEDURE — 99283 EMERGENCY DEPT VISIT LOW MDM: CPT

## 2022-09-19 RX ORDER — ACETAMINOPHEN 500 MG
1000 TABLET ORAL ONCE
Status: COMPLETED | OUTPATIENT
Start: 2022-09-19 | End: 2022-09-19

## 2022-09-19 RX ORDER — IBUPROFEN 600 MG/1
600 TABLET ORAL ONCE
Status: COMPLETED | OUTPATIENT
Start: 2022-09-19 | End: 2022-09-19

## 2022-09-19 RX ORDER — BUPROPION HYDROCHLORIDE 300 MG/1
300 TABLET ORAL EVERY MORNING
COMMUNITY

## 2022-09-19 RX ORDER — TRAZODONE HYDROCHLORIDE 50 MG/1
50 TABLET ORAL NIGHTLY
COMMUNITY

## 2022-09-19 RX ADMIN — IBUPROFEN 600 MG: 600 TABLET ORAL at 19:14

## 2022-09-19 RX ADMIN — ACETAMINOPHEN 1000 MG: 500 TABLET ORAL at 19:14

## 2022-09-19 ASSESSMENT — PAIN DESCRIPTION - ORIENTATION: ORIENTATION: LEFT

## 2022-09-19 ASSESSMENT — PAIN DESCRIPTION - PAIN TYPE: TYPE: ACUTE PAIN

## 2022-09-19 ASSESSMENT — PAIN - FUNCTIONAL ASSESSMENT: PAIN_FUNCTIONAL_ASSESSMENT: 0-10

## 2022-09-19 ASSESSMENT — PAIN DESCRIPTION - DESCRIPTORS: DESCRIPTORS: SHARP

## 2022-09-19 ASSESSMENT — PAIN DESCRIPTION - LOCATION: LOCATION: HIP;LEG;FOOT

## 2022-09-19 ASSESSMENT — PAIN DESCRIPTION - FREQUENCY: FREQUENCY: CONTINUOUS

## 2022-09-19 NOTE — ED TRIAGE NOTES
[facility] [place] ED  EMERGENCY DEPARTMENT ENCOUNTER      Pt Name: Tanner Vizcarra  MRN: [id number]  [place] 1960  Date of evaluation: 2022  Provider: Abby Alonzo MD    CHIEF COMPLAINT       Chief Complaint   Patient presents with    Fall     Patient presents ambulatory with complaint of left hip, leg and foot pain s/p fall. Pt reports was carrying a cabinet slipped on steps falling on left hip. States \"\"my leg came right up beside me and i'm concerned that I did something to my hip replacement and broke something in my foot\"  Denies hitting head or LOC Pt ambulatory upon arrival to the ER     Hip Pain    Foot Pain         HISTORY OF PRESENT ILLNESS   (Location/Symptom, Timing/Onset, Context/Setting, Quality, Duration, Modifying Factors, Severity)  Note limiting factors. Tanner Vizcarra is a 64 y.o. female who presents to the emergency department with concern for left hip pain and left foot pain status post nonsyncopal fall without any head trauma or loss consciousness. Patient states she is able to ambulate but she was carrying a cabinet and she said the cabinet may have shifted or she may have slipped but her leg bent and she is concerned because she had a hip replacement years ago. This happened at 11 AM this morning. Patient denies any acute focal neurodeficits. HPI    Nursing Notes were reviewed. REVIEW OF SYSTEMS    (2-9 systems for level 4, 10 or more for level 5)     Review of Systems   All other systems reviewed and are negative. Except as noted above the remainder of the review of systems was reviewed and negative.        PAST MEDICAL HISTORY     Past Medical History:   Diagnosis Date    Depression          SURGICAL HISTORY       Past Surgical History:   Procedure Laterality Date     SECTION      HIP ARTHROPLASTY Left     HYSTERECTOMY (CERVIX STATUS UNKNOWN)      ROTATOR CUFF REPAIR Left          CURRENT MEDICATIONS       Previous Medications    BUPROPION (WELLBUTRIN XL) 300 MG EXTENDED RELEASE TABLET    Take 300 mg by mouth every morning    TRAZODONE (DESYREL) 50 MG TABLET    Take 50 mg by mouth nightly       ALLERGIES     Patient has no known allergies. FAMILY HISTORY     No family history on file. SOCIAL HISTORY       Social History     Socioeconomic History    Marital status:    Tobacco Use    Smoking status: Never    Smokeless tobacco: Never   Substance and Sexual Activity    Alcohol use: Never    Drug use: Never    Sexual activity: Not Currently       SCREENINGS         Adonis Coma Scale  Eye Opening: Spontaneous  Best Verbal Response: Oriented  Best Motor Response: Obeys commands  [place] Coma Scale Score: 15                     CIWA Assessment  BP: 131/83  Heart Rate: 74                 PHYSICAL EXAM    (up to 7 for level 4, 8 or more for level 5)     ED Triage Vitals [09/19/22 1816]   BP Temp Temp Source Heart Rate Resp SpO2 Height Weight   131/83 98.6 °F (37 °C) Oral 74 16 97 % 5' 5\" (1.651 m) 175 lb (79.4 kg)       Physical Exam  Constitutional:       General: She is not in acute distress. Appearance: She is well-developed. She is not diaphoretic. HENT:      Head: Normocephalic and atraumatic. Eyes:      General:         Right eye: No discharge. Left eye: No discharge. Neck:      Trachea: No tracheal deviation. Cardiovascular:      Rate and Rhythm: Normal rate and regular rhythm. Heart sounds: No murmur heard. No friction rub. Pulmonary:      Effort: Pulmonary effort is normal. No respiratory distress. Breath sounds: No stridor. No wheezing or rales. Chest:      Chest wall: No tenderness. Abdominal:      General: There is no distension. Palpations: Abdomen is soft. There is no mass. Tenderness: There is no abdominal tenderness. There is no guarding or rebound. Musculoskeletal:         General: Tenderness present. Normal range of motion. Cervical back: Normal range of motion.       Comments: Left foot dorsal aspect mild swelling appreciated. No gross bony abnormalities. Tenderness with range of motion left hip region. Range of motion intact. Skin:     General: Skin is warm. Findings: No erythema or rash. Neurological:      Mental Status: She is alert and oriented to person, place, and time. Psychiatric:         Behavior: Behavior normal.       DIAGNOSTIC RESULTS     EKG: All EKG's are interpreted by the Emergency Department Physician who either signs or Co-signs this chart in the absence of a cardiologist.    ***    RADIOLOGY:   Non-plain film images such as CT, Ultrasound and MRI are read by the radiologist. Plain radiographic images are visualized and preliminarily interpreted by the emergency physician with the below findings:    ***    Interpretation per the Radiologist below, if available at the time of this note:    XR HIP 2-3 VW W PELVIS LEFT    (Results Pending)   XR FOOT LEFT (MIN 3 VIEWS)    (Results Pending)         ED BEDSIDE ULTRASOUND:   Performed by ED Physician - none    LABS:  Labs Reviewed - No data to display    All other labs were within normal range or not returned as of this dictation. EMERGENCY DEPARTMENT COURSE and DIFFERENTIAL DIAGNOSIS/MDM:   Vitals:    Vitals:    09/19/22 1816   BP: 131/83   Pulse: 74   Resp: 16   Temp: 98.6 °F (37 °C)   TempSrc: Oral   SpO2: 97%   Weight: 175 lb (79.4 kg)   Height: 5' 5\" (1.651 m)       ***    MDM  Number of Diagnoses or Management Options  Diagnosis management comments: 75-year-old female presenting status post nonsyncopal fall without any head trauma complaining of left foot and left hip pain. Exam did not demonstrate gross bony abnormalities. Plain film was ordered of left foot and left hip and patient was provided ibuprofen and Tylenol. REASSESSMENT          CRITICAL CARE TIME   Total Critical Care time was *** minutes, excluding separately reportable procedures.   There was a high probability of clinically significant/life threatening deterioration in the patient's condition which required my urgent intervention. ***    CONSULTS:  None    PROCEDURES:  Unless otherwise noted below, none     Procedures    No LOS Charge filed ***    FINAL IMPRESSION    No diagnosis found. DISPOSITION/PLAN   DISPOSITION        PATIENT REFERRED TO:  No follow-up provider specified. DISCHARGE MEDICATIONS:  New Prescriptions    No medications on file     Controlled Substances Monitoring:     No flowsheet data found.     (Please note that portions of this note were completed with a voice recognition program.  Efforts were made to edit the dictations but occasionally words are mis-transcribed.)    Komal Baker MD (electronically signed)  Attending Emergency Physician

## 2022-09-19 NOTE — DISCHARGE INSTRUCTIONS
Please ice for next 48 hours 20 minutes 4 times a day. Please use 400 mg of ibuprofen +500 mg of acetaminophen every 4-6 hours as needed.

## 2022-09-20 NOTE — ED PROVIDER NOTES
HISTORY OF PRESENT ILLNESS   (Location/Symptom, Timing/Onset, Context/Setting, Quality, Duration, Modifying Factors, Severity)  Note limiting factors. Jairo Guy is a 64 y.o. female who presents to the emergency department with concern for left hip pain and left foot pain status post nonsyncopal fall without any head trauma or loss consciousness. Patient states she is able to ambulate but she was carrying a cabinet and she said the cabinet may have shifted or she may have slipped but her leg bent and she is concerned because she had a hip replacement years ago. This happened at 11 AM this morning. Patient denies any acute focal neurodeficits. HPI     Nursing Notes were reviewed. REVIEW OF SYSTEMS    (2-9 systems for level 4, 10 or more for level 5)      Review of Systems   All other systems reviewed and are negative. Except as noted above the remainder of the review of systems was reviewed and negative. PAST MEDICAL HISTORY      Past Medical History        Past Medical History:   Diagnosis Date    Depression                 SURGICAL HISTORY        Past Surgical History         Past Surgical History:   Procedure Laterality Date     SECTION        HIP ARTHROPLASTY Left      HYSTERECTOMY (CERVIX STATUS UNKNOWN)        ROTATOR CUFF REPAIR Left                 CURRENT MEDICATIONS             Previous Medications     BUPROPION (WELLBUTRIN XL) 300 MG EXTENDED RELEASE TABLET    Take 300 mg by mouth every morning     TRAZODONE (DESYREL) 50 MG TABLET    Take 50 mg by mouth nightly         ALLERGIES     Patient has no known allergies. FAMILY HISTORY     Family History   No family history on file.            SOCIAL HISTORY        Social History   Social History           Socioeconomic History    Marital status:    Tobacco Use    Smoking status: Never    Smokeless tobacco: Never   Substance and Sexual Activity    Alcohol use: Never    Drug use: Never    Sexual activity: Not Currently            SCREENINGS          Adonis Coma Scale  Eye Opening: Spontaneous  Best Verbal Response: Oriented  Best Motor Response: Obeys commands  [place] Coma Scale Score: 15                   CIWA Assessment  BP: 131/83  Heart Rate: 74                    PHYSICAL EXAM    (up to 7 for level 4, 8 or more for level 5)                ED Triage Vitals [09/19/22 1816]   BP Temp Temp Source Heart Rate Resp SpO2 Height Weight   131/83 98.6 °F (37 °C) Oral 74 16 97 % 5' 5\" (1.651 m) 175 lb (79.4 kg)         Physical Exam  Constitutional:       General: She is not in acute distress. Appearance: She is well-developed. She is not diaphoretic. HENT:      Head: Normocephalic and atraumatic. Eyes:      General:         Right eye: No discharge. Left eye: No discharge. Neck:      Trachea: No tracheal deviation. Cardiovascular:      Rate and Rhythm: Normal rate and regular rhythm. Heart sounds: No murmur heard. No friction rub. Pulmonary:      Effort: Pulmonary effort is normal. No respiratory distress. Breath sounds: No stridor. No wheezing or rales. Chest:      Chest wall: No tenderness. Abdominal:      General: There is no distension. Palpations: Abdomen is soft. There is no mass. Tenderness: There is no abdominal tenderness. There is no guarding or rebound. Musculoskeletal:         General: Tenderness present. Normal range of motion. Cervical back: Normal range of motion. Comments: Left foot dorsal aspect mild swelling appreciated. No gross bony abnormalities. Tenderness with range of motion left hip region. Range of motion intact. Skin:     General: Skin is warm. Findings: No erythema or rash. Neurological:      Mental Status: She is alert and oriented to person, place, and time.    Psychiatric:         Behavior: Behavior normal.         DIAGNOSTIC RESULTS      EKG: All EKG's are interpreted by the Emergency Department Physician who either signs or Co-signs this chart in the absence of a cardiologist.          RADIOLOGY:   Non-plain film images such as CT, Ultrasound and MRI are read by the radiologist. Plain radiographic images are visualized and preliminarily interpreted by the emergency physician with the below findings:        Interpretation per the Radiologist below, if available at the time of this note:              ED BEDSIDE ULTRASOUND:   Performed by ED Physician - none     LABS:  Labs Reviewed - No data to display     All other labs were within normal range or not returned as of this dictation. EMERGENCY DEPARTMENT COURSE and DIFFERENTIAL DIAGNOSIS/MDM:   Vitals:    Vitals       Vitals:     09/19/22 1816   BP: 131/83   Pulse: 74   Resp: 16   Temp: 98.6 °F (37 °C)   TempSrc: Oral   SpO2: 97%   Weight: 175 lb (79.4 kg)   Height: 5' 5\" (1.651 m)                 MDM  Number of Diagnoses or Management Options  Diagnosis management comments: 25-year-old female presenting status post nonsyncopal fall without any head trauma complaining of left foot and left hip pain. Exam did not demonstrate gross bony abnormalities. Plain film was ordered of left foot and left hip and patient was provided ibuprofen and Tylenol. Plain films did not demonstrate any acute pathology. REASSESSMENT         CRITICAL CARE TIME   Total Critical Care time was  minutes, excluding separately reportable procedures. There was a high probability of clinically significant/life threatening deterioration in the patient's condition which required my urgent intervention. CONSULTS:  None     PROCEDURES:  Unless otherwise noted below, none     Procedures     No LOS Charge filed      FINAL IMPRESSION      1. Musculoskeletal pain           DISPOSITION/PLAN   DISPOSITION          PATIENT REFERRED TO:  No follow-up provider specified.      DISCHARGE MEDICATIONS:      New Prescriptions     No medications on file        Yamileth Anderson MD  09/20/22 [id number] Lydia Gregory Nahun Partida MD  10/18/22 [id number]

## 2023-05-07 ENCOUNTER — CLINICAL DOCUMENTATION (OUTPATIENT)
Dept: OTHER | Facility: CLINIC | Age: 63
End: 2023-05-07

## 2023-10-02 ENCOUNTER — HOSPITAL ENCOUNTER (OUTPATIENT)
Age: 63
Discharge: HOME | End: 2023-10-02
Payer: COMMERCIAL

## 2023-10-02 DIAGNOSIS — Z83.49 FAMILY HISTORY OF THYROID DISEASE: ICD-10-CM

## 2023-10-02 DIAGNOSIS — F32.A DEPRESSION, UNSPECIFIED DEPRESSION TYPE: ICD-10-CM

## 2023-10-02 DIAGNOSIS — M19.90 ARTHRITIS: ICD-10-CM

## 2023-10-02 DIAGNOSIS — R53.83 FATIGUE, UNSPECIFIED TYPE: ICD-10-CM

## 2023-10-02 DIAGNOSIS — J45.909 ASTHMA, UNSPECIFIED ASTHMA SEVERITY, UNSPECIFIED WHETHER COMPLICATED, UNSPECIFIED WHETHER PERSISTENT: ICD-10-CM

## 2023-10-02 DIAGNOSIS — G43.909 MIGRAINE WITHOUT STATUS MIGRAINOSUS, NOT INTRACTABLE, UNSPECIFIED MIGRAINE TYPE: ICD-10-CM

## 2023-10-02 LAB
25(OH)D3 SERPL-MCNC: 30.8 NG/ML
ALBUMIN SERPL-MCNC: 4.3 G/DL (ref 3.5–5.2)
ALBUMIN/GLOB SERPL: 1.5 {RATIO} (ref 1–2.5)
ALP SERPL-CCNC: 86 U/L (ref 35–104)
ALT SERPL-CCNC: 18 U/L (ref 5–33)
ANION GAP SERPL CALCULATED.3IONS-SCNC: 13 MMOL/L (ref 9–17)
AST SERPL-CCNC: 18 U/L
BILIRUB SERPL-MCNC: 0.5 MG/DL (ref 0.3–1.2)
BUN SERPL-MCNC: 7 MG/DL (ref 8–23)
BUN/CREAT SERPL: 9 (ref 9–20)
CALCIUM SERPL-MCNC: 9.5 MG/DL (ref 8.6–10.4)
CHLORIDE SERPL-SCNC: 104 MMOL/L (ref 98–107)
CHOLEST SERPL-MCNC: 226 MG/DL
CHOLESTEROL/HDL RATIO: 4.7
CO2 SERPL-SCNC: 25 MMOL/L (ref 20–31)
CREAT SERPL-MCNC: 0.8 MG/DL (ref 0.5–0.9)
ERYTHROCYTE [DISTWIDTH] IN BLOOD BY AUTOMATED COUNT: 12.9 % (ref 11.8–14.4)
FERRITIN SERPL-MCNC: 67 NG/ML (ref 13–150)
GFR SERPL CREATININE-BSD FRML MDRD: >60 ML/MIN/1.73M2
GLUCOSE SERPL-MCNC: 87 MG/DL (ref 70–99)
HCT VFR BLD AUTO: 38.8 % (ref 36.3–47.1)
HDLC SERPL-MCNC: 48 MG/DL
HGB BLD-MCNC: 12.7 G/DL (ref 11.9–15.1)
IRON SATN MFR SERPL: 34 % (ref 20–55)
IRON SERPL-MCNC: 104 UG/DL (ref 37–145)
LDLC SERPL CALC-MCNC: 158 MG/DL (ref 0–130)
MCH RBC QN AUTO: 29.8 PG (ref 25.2–33.5)
MCHC RBC AUTO-ENTMCNC: 32.7 G/DL (ref 28.4–34.8)
MCV RBC AUTO: 91.1 FL (ref 82.6–102.9)
NRBC BLD-RTO: 0 PER 100 WBC
PLATELET # BLD AUTO: 217 K/UL (ref 138–453)
PMV BLD AUTO: 9.7 FL (ref 8.1–13.5)
POTASSIUM SERPL-SCNC: 4.1 MMOL/L (ref 3.7–5.3)
PROT SERPL-MCNC: 7.1 G/DL (ref 6.4–8.3)
RBC # BLD AUTO: 4.26 M/UL (ref 3.95–5.11)
SODIUM SERPL-SCNC: 142 MMOL/L (ref 135–144)
T3FREE SERPL-MCNC: 2.37 PG/ML (ref 2.02–4.43)
T4 FREE SERPL-MCNC: 1 NG/DL (ref 0.9–1.7)
TIBC SERPL-MCNC: 307 UG/DL (ref 250–450)
TRIGL SERPL-MCNC: 100 MG/DL
TSH SERPL DL<=0.05 MIU/L-ACNC: 4.22 UIU/ML (ref 0.3–5)
UNSATURATED IRON BINDING CAPACITY: 203 UG/DL (ref 112–347)
WBC OTHER # BLD: 3.8 K/UL (ref 3.5–11.3)

## 2023-10-02 PROCEDURE — 83550 IRON BINDING TEST: CPT

## 2023-10-02 PROCEDURE — 80053 COMPREHEN METABOLIC PANEL: CPT

## 2023-10-02 PROCEDURE — 82728 ASSAY OF FERRITIN: CPT

## 2023-10-02 PROCEDURE — 36415 COLL VENOUS BLD VENIPUNCTURE: CPT

## 2023-10-02 PROCEDURE — 83540 ASSAY OF IRON: CPT

## 2023-10-02 PROCEDURE — 84481 FREE ASSAY (FT-3): CPT

## 2023-10-02 PROCEDURE — 85027 COMPLETE CBC AUTOMATED: CPT

## 2023-10-02 PROCEDURE — 80061 LIPID PANEL: CPT

## 2023-10-02 PROCEDURE — 84439 ASSAY OF FREE THYROXINE: CPT

## 2023-10-02 PROCEDURE — 84443 ASSAY THYROID STIM HORMONE: CPT

## 2023-10-02 PROCEDURE — 82306 VITAMIN D 25 HYDROXY: CPT

## 2024-04-17 ENCOUNTER — HOSPITAL ENCOUNTER (OUTPATIENT)
Age: 64
Discharge: HOME | End: 2024-04-17
Payer: COMMERCIAL

## 2024-04-17 DIAGNOSIS — Z00.00 ROUTINE GENERAL MEDICAL EXAMINATION AT A HEALTH CARE FACILITY: ICD-10-CM

## 2024-04-17 LAB
ALBUMIN SERPL-MCNC: 4.1 G/DL (ref 3.5–5.2)
ALBUMIN/GLOB SERPL: 1.2 {RATIO} (ref 1–2.5)
ALP SERPL-CCNC: 100 U/L (ref 35–104)
ALT SERPL-CCNC: 24 U/L (ref 5–33)
ANION GAP SERPL CALCULATED.3IONS-SCNC: 11 MMOL/L (ref 9–17)
AST SERPL-CCNC: 23 U/L
BILIRUB SERPL-MCNC: 0.4 MG/DL (ref 0.3–1.2)
BUN SERPL-MCNC: 5 MG/DL (ref 8–23)
BUN/CREAT SERPL: 7 (ref 9–20)
CALCIUM SERPL-MCNC: 9.4 MG/DL (ref 8.6–10.4)
CHLORIDE SERPL-SCNC: 101 MMOL/L (ref 98–107)
CHOLEST SERPL-MCNC: 112 MG/DL (ref 0–199)
CHOLESTEROL/HDL RATIO: 2
CO2 SERPL-SCNC: 25 MMOL/L (ref 20–31)
CREAT SERPL-MCNC: 0.7 MG/DL (ref 0.5–0.9)
ERYTHROCYTE [DISTWIDTH] IN BLOOD BY AUTOMATED COUNT: 12.4 % (ref 11.8–14.4)
EST. AVERAGE GLUCOSE BLD GHB EST-MCNC: 120 MG/DL
GFR SERPL CREATININE-BSD FRML MDRD: >90 ML/MIN/1.73M2
GLUCOSE SERPL-MCNC: 118 MG/DL (ref 70–99)
HBA1C MFR BLD: 5.8 % (ref 4–6)
HCT VFR BLD AUTO: 38.6 % (ref 36.3–47.1)
HDLC SERPL-MCNC: 51 MG/DL
HGB BLD-MCNC: 13 G/DL (ref 11.9–15.1)
LDLC SERPL CALC-MCNC: 47 MG/DL (ref 0–100)
MCH RBC QN AUTO: 29.4 PG (ref 25.2–33.5)
MCHC RBC AUTO-ENTMCNC: 33.7 G/DL (ref 28.4–34.8)
MCV RBC AUTO: 87.3 FL (ref 82.6–102.9)
NRBC BLD-RTO: 0 PER 100 WBC
PLATELET # BLD AUTO: 217 K/UL (ref 138–453)
PMV BLD AUTO: 9.3 FL (ref 8.1–13.5)
POTASSIUM SERPL-SCNC: 4.3 MMOL/L (ref 3.7–5.3)
PROT SERPL-MCNC: 7.5 G/DL (ref 6.4–8.3)
RBC # BLD AUTO: 4.42 M/UL (ref 3.95–5.11)
SODIUM SERPL-SCNC: 137 MMOL/L (ref 135–144)
TRIGL SERPL-MCNC: 67 MG/DL
VLDLC SERPL CALC-MCNC: 13 MG/DL
WBC OTHER # BLD: 5.3 K/UL (ref 3.5–11.3)

## 2024-04-17 PROCEDURE — 83036 HEMOGLOBIN GLYCOSYLATED A1C: CPT

## 2024-04-17 PROCEDURE — 80053 COMPREHEN METABOLIC PANEL: CPT

## 2024-04-17 PROCEDURE — 36415 COLL VENOUS BLD VENIPUNCTURE: CPT

## 2024-04-17 PROCEDURE — 85027 COMPLETE CBC AUTOMATED: CPT

## 2024-04-17 PROCEDURE — 80061 LIPID PANEL: CPT

## 2024-06-25 ENCOUNTER — HOSPITAL ENCOUNTER (OUTPATIENT)
Dept: WOMENS IMAGING | Age: 64
Discharge: HOME | End: 2024-06-27
Payer: COMMERCIAL

## 2024-06-25 VITALS — WEIGHT: 187 LBS | BODY MASS INDEX: 31.16 KG/M2 | HEIGHT: 65 IN

## 2024-06-25 DIAGNOSIS — Z12.31 ENCOUNTER FOR SCREENING MAMMOGRAM FOR MALIGNANT NEOPLASM OF BREAST: ICD-10-CM

## 2024-06-25 PROCEDURE — 77063 BREAST TOMOSYNTHESIS BI: CPT

## 2024-10-18 ENCOUNTER — HOSPITAL ENCOUNTER (OUTPATIENT)
Age: 64
Discharge: HOME | End: 2024-10-18
Payer: COMMERCIAL

## 2024-10-18 DIAGNOSIS — E78.2 MIXED HYPERLIPIDEMIA: ICD-10-CM

## 2024-10-18 DIAGNOSIS — R73.01 IMPAIRED FASTING GLUCOSE: ICD-10-CM

## 2024-10-18 LAB
ALBUMIN SERPL-MCNC: 4.1 G/DL (ref 3.5–5.2)
ALBUMIN/GLOB SERPL: 1.4 {RATIO} (ref 1–2.5)
ALP SERPL-CCNC: 82 U/L (ref 35–104)
ALT SERPL-CCNC: 24 U/L (ref 10–35)
ANION GAP SERPL CALCULATED.3IONS-SCNC: 9 MMOL/L (ref 9–16)
AST SERPL-CCNC: 21 U/L (ref 10–35)
BILIRUB SERPL-MCNC: 0.4 MG/DL (ref 0–1.2)
BUN SERPL-MCNC: 8 MG/DL (ref 8–23)
BUN/CREAT SERPL: 11 (ref 9–20)
CALCIUM SERPL-MCNC: 9.6 MG/DL (ref 8.6–10.4)
CHLORIDE SERPL-SCNC: 105 MMOL/L (ref 98–107)
CHOLEST SERPL-MCNC: 146 MG/DL (ref 0–199)
CHOLESTEROL/HDL RATIO: 3
CO2 SERPL-SCNC: 26 MMOL/L (ref 20–31)
CREAT SERPL-MCNC: 0.7 MG/DL (ref 0.5–0.9)
ERYTHROCYTE [DISTWIDTH] IN BLOOD BY AUTOMATED COUNT: 12.8 % (ref 11.8–14.4)
EST. AVERAGE GLUCOSE BLD GHB EST-MCNC: 123 MG/DL
GFR, ESTIMATED: >90 ML/MIN/1.73M2
GLUCOSE SERPL-MCNC: 90 MG/DL (ref 74–99)
HBA1C MFR BLD: 5.9 % (ref 4–6)
HCT VFR BLD AUTO: 39.1 % (ref 36.3–47.1)
HDLC SERPL-MCNC: 51 MG/DL
HGB BLD-MCNC: 12.9 G/DL (ref 11.9–15.1)
LDLC SERPL CALC-MCNC: 69 MG/DL (ref 0–100)
MCH RBC QN AUTO: 29.7 PG (ref 25.2–33.5)
MCHC RBC AUTO-ENTMCNC: 33 G/DL (ref 28.4–34.8)
MCV RBC AUTO: 89.9 FL (ref 82.6–102.9)
NRBC BLD-RTO: 0 PER 100 WBC
PLATELET # BLD AUTO: 248 K/UL (ref 138–453)
PMV BLD AUTO: 9.4 FL (ref 8.1–13.5)
POTASSIUM SERPL-SCNC: 4.1 MMOL/L (ref 3.7–5.3)
PROT SERPL-MCNC: 7.1 G/DL (ref 6.6–8.7)
RBC # BLD AUTO: 4.35 M/UL (ref 3.95–5.11)
SODIUM SERPL-SCNC: 140 MMOL/L (ref 136–145)
TRIGL SERPL-MCNC: 128 MG/DL
VLDLC SERPL CALC-MCNC: 26 MG/DL
WBC OTHER # BLD: 4.6 K/UL (ref 3.5–11.3)

## 2024-10-18 PROCEDURE — 83036 HEMOGLOBIN GLYCOSYLATED A1C: CPT

## 2024-10-18 PROCEDURE — 85027 COMPLETE CBC AUTOMATED: CPT

## 2024-10-18 PROCEDURE — 80061 LIPID PANEL: CPT

## 2024-10-18 PROCEDURE — 36415 COLL VENOUS BLD VENIPUNCTURE: CPT

## 2024-10-18 PROCEDURE — 80053 COMPREHEN METABOLIC PANEL: CPT

## 2024-10-23 ENCOUNTER — HOSPITAL ENCOUNTER (OUTPATIENT)
Dept: GENERAL RADIOLOGY | Age: 64
Discharge: HOME | End: 2024-10-25
Payer: COMMERCIAL

## 2024-10-23 ENCOUNTER — HOSPITAL ENCOUNTER (OUTPATIENT)
Age: 64
Discharge: HOME | End: 2024-10-23
Payer: COMMERCIAL

## 2024-10-23 DIAGNOSIS — M25.551 RIGHT HIP PAIN: ICD-10-CM

## 2024-10-23 PROCEDURE — 73502 X-RAY EXAM HIP UNI 2-3 VIEWS: CPT

## 2025-04-21 ENCOUNTER — HOSPITAL ENCOUNTER (OUTPATIENT)
Age: 65
Discharge: HOME | End: 2025-04-21
Payer: COMMERCIAL

## 2025-04-21 DIAGNOSIS — E11.69 HYPERLIPIDEMIA ASSOCIATED WITH TYPE 2 DIABETES MELLITUS (HCC): ICD-10-CM

## 2025-04-21 DIAGNOSIS — E78.5 HYPERLIPIDEMIA ASSOCIATED WITH TYPE 2 DIABETES MELLITUS (HCC): ICD-10-CM

## 2025-04-21 LAB
ALBUMIN SERPL-MCNC: 4.3 G/DL (ref 3.5–5.2)
ALBUMIN/GLOB SERPL: 1.4 {RATIO} (ref 1–2.5)
ALP SERPL-CCNC: 98 U/L (ref 35–104)
ALT SERPL-CCNC: 22 U/L (ref 10–35)
ANION GAP SERPL CALCULATED.3IONS-SCNC: 12 MMOL/L (ref 9–16)
AST SERPL-CCNC: 24 U/L (ref 10–35)
BILIRUB SERPL-MCNC: 0.3 MG/DL (ref 0–1.2)
BUN SERPL-MCNC: 11 MG/DL (ref 8–23)
BUN/CREAT SERPL: 16 (ref 9–20)
CALCIUM SERPL-MCNC: 9.7 MG/DL (ref 8.6–10.4)
CHLORIDE SERPL-SCNC: 105 MMOL/L (ref 98–107)
CHOLEST SERPL-MCNC: 150 MG/DL (ref 0–199)
CHOLESTEROL/HDL RATIO: 2.6
CO2 SERPL-SCNC: 24 MMOL/L (ref 20–31)
CREAT SERPL-MCNC: 0.7 MG/DL (ref 0.5–0.9)
ERYTHROCYTE [DISTWIDTH] IN BLOOD BY AUTOMATED COUNT: 12.5 % (ref 11.8–14.4)
EST. AVERAGE GLUCOSE BLD GHB EST-MCNC: 103 MG/DL
GFR, ESTIMATED: >90 ML/MIN/1.73M2
GLUCOSE SERPL-MCNC: 105 MG/DL (ref 74–99)
HBA1C MFR BLD: 5.2 % (ref 4–6)
HCT VFR BLD AUTO: 36.7 % (ref 36.3–47.1)
HDLC SERPL-MCNC: 57 MG/DL
HGB BLD-MCNC: 11.5 G/DL (ref 11.9–15.1)
LDLC SERPL CALC-MCNC: 71 MG/DL (ref 0–100)
MCH RBC QN AUTO: 27.9 PG (ref 25.2–33.5)
MCHC RBC AUTO-ENTMCNC: 31.3 G/DL (ref 28.4–34.8)
MCV RBC AUTO: 89.1 FL (ref 82.6–102.9)
NRBC BLD-RTO: 0 PER 100 WBC
PLATELET # BLD AUTO: 280 K/UL (ref 138–453)
PMV BLD AUTO: 9.6 FL (ref 8.1–13.5)
POTASSIUM SERPL-SCNC: 4.4 MMOL/L (ref 3.7–5.3)
PROT SERPL-MCNC: 7.2 G/DL (ref 6.6–8.7)
RBC # BLD AUTO: 4.12 M/UL (ref 3.95–5.11)
SODIUM SERPL-SCNC: 141 MMOL/L (ref 136–145)
TRIGL SERPL-MCNC: 110 MG/DL
VLDLC SERPL CALC-MCNC: 22 MG/DL (ref 1–30)
WBC OTHER # BLD: 5.6 K/UL (ref 3.5–11.3)

## 2025-04-21 PROCEDURE — 36415 COLL VENOUS BLD VENIPUNCTURE: CPT

## 2025-04-21 PROCEDURE — 83036 HEMOGLOBIN GLYCOSYLATED A1C: CPT

## 2025-04-21 PROCEDURE — 80061 LIPID PANEL: CPT

## 2025-04-21 PROCEDURE — 85027 COMPLETE CBC AUTOMATED: CPT

## 2025-04-21 PROCEDURE — 80053 COMPREHEN METABOLIC PANEL: CPT

## 2025-04-26 ENCOUNTER — HOSPITAL ENCOUNTER (OUTPATIENT)
Age: 65
Discharge: HOME | End: 2025-04-26
Payer: COMMERCIAL

## 2025-04-26 DIAGNOSIS — R53.83 OTHER FATIGUE: ICD-10-CM

## 2025-04-26 DIAGNOSIS — R63.5 WEIGHT GAIN: ICD-10-CM

## 2025-04-26 LAB
T3FREE SERPL-MCNC: 2.81 PG/ML (ref 2–4.4)
T4 FREE SERPL-MCNC: 0.8 NG/DL (ref 0.92–1.68)
TSH SERPL DL<=0.05 MIU/L-ACNC: 3.28 UIU/ML (ref 0.27–4.2)

## 2025-04-26 PROCEDURE — 84443 ASSAY THYROID STIM HORMONE: CPT

## 2025-04-26 PROCEDURE — 84481 FREE ASSAY (FT-3): CPT

## 2025-04-26 PROCEDURE — 36415 COLL VENOUS BLD VENIPUNCTURE: CPT

## 2025-04-26 PROCEDURE — 84439 ASSAY OF FREE THYROXINE: CPT
